# Patient Record
Sex: FEMALE | Race: WHITE | NOT HISPANIC OR LATINO | RURAL
[De-identification: names, ages, dates, MRNs, and addresses within clinical notes are randomized per-mention and may not be internally consistent; named-entity substitution may affect disease eponyms.]

---

## 2024-09-09 ENCOUNTER — HOSPITAL ENCOUNTER (EMERGENCY)
Facility: HOSPITAL | Age: 89
Discharge: HOME | End: 2024-09-09
Attending: EMERGENCY MEDICINE

## 2024-09-09 VITALS
BODY MASS INDEX: 29.99 KG/M2 | HEIGHT: 65 IN | HEART RATE: 78 BPM | WEIGHT: 180 LBS | RESPIRATION RATE: 20 BRPM | SYSTOLIC BLOOD PRESSURE: 188 MMHG | DIASTOLIC BLOOD PRESSURE: 98 MMHG | TEMPERATURE: 98 F | OXYGEN SATURATION: 97 %

## 2024-09-09 DIAGNOSIS — M79.18 MUSCULOSKELETAL PAIN: ICD-10-CM

## 2024-09-09 DIAGNOSIS — I10 HYPERTENSION, UNSPECIFIED TYPE: ICD-10-CM

## 2024-09-09 DIAGNOSIS — R10.9 LEFT FLANK PAIN: Primary | ICD-10-CM

## 2024-09-09 LAB
ALBUMIN SERPL BCP-MCNC: 3.7 G/DL (ref 3.5–5)
ALBUMIN/GLOB SERPL: 0.9 {RATIO}
ALP SERPL-CCNC: 79 U/L (ref 55–142)
ALT SERPL W P-5'-P-CCNC: 19 U/L (ref 13–56)
ANION GAP SERPL CALCULATED.3IONS-SCNC: 13 MMOL/L (ref 7–16)
AST SERPL W P-5'-P-CCNC: 15 U/L (ref 15–37)
BASOPHILS # BLD AUTO: 0.02 K/UL (ref 0–0.2)
BASOPHILS NFR BLD AUTO: 0.2 % (ref 0–1)
BILIRUB SERPL-MCNC: 0.8 MG/DL (ref ?–1.2)
BILIRUB UR QL STRIP: NEGATIVE
BUN SERPL-MCNC: 26 MG/DL (ref 7–18)
BUN/CREAT SERPL: 25 (ref 6–20)
CALCIUM SERPL-MCNC: 9.3 MG/DL (ref 8.5–10.1)
CHLORIDE SERPL-SCNC: 94 MMOL/L (ref 98–107)
CLARITY UR: CLEAR
CO2 SERPL-SCNC: 28 MMOL/L (ref 21–32)
COLOR UR: YELLOW
CREAT SERPL-MCNC: 1.05 MG/DL (ref 0.55–1.02)
DIFFERENTIAL METHOD BLD: ABNORMAL
EGFR (NO RACE VARIABLE) (RUSH/TITUS): 50 ML/MIN/1.73M2
EOSINOPHIL # BLD AUTO: 0.04 K/UL (ref 0–0.5)
EOSINOPHIL NFR BLD AUTO: 0.5 % (ref 1–4)
ERYTHROCYTE [DISTWIDTH] IN BLOOD BY AUTOMATED COUNT: 13.1 % (ref 11.5–14.5)
GLOBULIN SER-MCNC: 3.9 G/DL (ref 2–4)
GLUCOSE SERPL-MCNC: 112 MG/DL (ref 74–106)
GLUCOSE UR STRIP-MCNC: NEGATIVE MG/DL
HCT VFR BLD AUTO: 41.2 % (ref 38–47)
HGB BLD-MCNC: 13.3 G/DL (ref 12–16)
KETONES UR STRIP-SCNC: NEGATIVE MG/DL
LEUKOCYTE ESTERASE UR QL STRIP: NEGATIVE
LYMPHOCYTES # BLD AUTO: 1.92 K/UL (ref 1–4.8)
LYMPHOCYTES NFR BLD AUTO: 22 % (ref 27–41)
MCH RBC QN AUTO: 30.4 PG (ref 27–31)
MCHC RBC AUTO-ENTMCNC: 32.3 G/DL (ref 32–36)
MCV RBC AUTO: 94.3 FL (ref 80–96)
MONOCYTES # BLD AUTO: 0.88 K/UL (ref 0–0.8)
MONOCYTES NFR BLD AUTO: 10.1 % (ref 2–6)
MPC BLD CALC-MCNC: 8.7 FL (ref 9.4–12.4)
NEUTROPHILS # BLD AUTO: 5.85 K/UL (ref 1.8–7.7)
NEUTROPHILS NFR BLD AUTO: 67.2 % (ref 53–65)
NITRITE UR QL STRIP: NEGATIVE
PH UR STRIP: 6.5 PH UNITS
PLATELET # BLD AUTO: 245 K/UL (ref 150–400)
POTASSIUM SERPL-SCNC: 5.1 MMOL/L (ref 3.5–5.1)
PROT SERPL-MCNC: 7.6 G/DL (ref 6.4–8.2)
PROT UR QL STRIP: NEGATIVE
RBC # BLD AUTO: 4.37 M/UL (ref 4.2–5.4)
RBC # UR STRIP: NEGATIVE /UL
SODIUM SERPL-SCNC: 130 MMOL/L (ref 136–145)
SP GR UR STRIP: 1.01
UROBILINOGEN UR STRIP-ACNC: 0.2 MG/DL
WBC # BLD AUTO: 8.71 K/UL (ref 4.5–11)

## 2024-09-09 PROCEDURE — 99284 EMERGENCY DEPT VISIT MOD MDM: CPT | Mod: 25

## 2024-09-09 PROCEDURE — 80053 COMPREHEN METABOLIC PANEL: CPT | Performed by: EMERGENCY MEDICINE

## 2024-09-09 PROCEDURE — 81003 URINALYSIS AUTO W/O SCOPE: CPT | Performed by: EMERGENCY MEDICINE

## 2024-09-09 PROCEDURE — 36415 COLL VENOUS BLD VENIPUNCTURE: CPT | Performed by: EMERGENCY MEDICINE

## 2024-09-09 PROCEDURE — 85025 COMPLETE CBC W/AUTO DIFF WBC: CPT | Performed by: EMERGENCY MEDICINE

## 2024-09-09 RX ORDER — CARVEDILOL 12.5 MG/1
12.5 TABLET ORAL 2 TIMES DAILY WITH MEALS
COMMUNITY

## 2024-09-09 RX ORDER — OMEPRAZOLE 20 MG/1
20 TABLET, DELAYED RELEASE ORAL DAILY
COMMUNITY

## 2024-09-09 RX ORDER — FUROSEMIDE 40 MG/1
40 TABLET ORAL DAILY PRN
COMMUNITY

## 2024-09-09 RX ORDER — DABIGATRAN ETEXILATE 150 MG/1
CAPSULE ORAL
COMMUNITY

## 2024-09-09 RX ORDER — CLOPIDOGREL BISULFATE 75 MG/1
75 TABLET ORAL DAILY
COMMUNITY

## 2024-09-09 RX ORDER — LOSARTAN POTASSIUM 50 MG/1
50 TABLET ORAL 2 TIMES DAILY
COMMUNITY

## 2024-09-09 RX ORDER — HYDROCODONE BITARTRATE AND ACETAMINOPHEN 5; 325 MG/1; MG/1
1 TABLET ORAL EVERY 12 HOURS PRN
COMMUNITY

## 2024-09-09 NOTE — ED PROVIDER NOTES
Encounter Date: 9/9/2024       History     Chief Complaint   Patient presents with    Flank Pain     Left      PT IS A 89 YR OLD WF WITH L FLANK PAIN ONSET THIS AM DESCRIBED AS SHARP AND JUST HURTS, PT CONCERNED AS SHE HAS HAD R NEPHRECTOMY PRIOR  (DONOR FOR HER SON). PT HAS HAD A PRIOR KIDNEY STONE   PT DENIES FEVER/ABI, N/V/D, PALPITATIONS,TRAUMA OR SYNCOPE    The history is provided by the patient and a relative.     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  Social History     Tobacco Use    Smoking status: Never    Smokeless tobacco: Never   Substance Use Topics    Alcohol use: Never    Drug use: Never     Review of Systems    Physical Exam     Initial Vitals [09/09/24 1054]   BP Pulse Resp Temp SpO2   (!) 205/107 78 20 97.6 °F (36.4 °C) 97 %      MAP       --         Physical Exam    Nursing note and vitals reviewed.  Constitutional: She appears well-developed and well-nourished. She is cooperative. She appears distressed.   HENT:   Head: Normocephalic and atraumatic.   Right Ear: External ear normal.   Left Ear: External ear normal.   Nose: Nose normal.   Mouth/Throat: Oropharynx is clear and moist.   Eyes: Conjunctivae and EOM are normal. Pupils are equal, round, and reactive to light.   Neck: Trachea normal. Neck supple.   Cardiovascular:  Normal rate, regular rhythm, normal heart sounds and intact distal pulses.           Pulses:       Radial pulses are 3+ on the right side and 3+ on the left side.        Dorsalis pedis pulses are 3+ on the right side and 3+ on the left side.   Pulmonary/Chest: Breath sounds normal. No  respiratory distress.   Abdominal: Abdomen is soft. Bowel sounds are normal. She exhibits no distension. There is no abdominal tenderness.   Musculoskeletal:         General: Tenderness (L FLANK) present. Normal range of motion.      Right shoulder: Normal.      Left shoulder: Normal.      Right upper arm: Normal.      Left upper arm: Normal.      Right elbow: Normal.      Left elbow: Normal.      Right forearm: Normal.      Left forearm: Normal.      Right wrist: Normal.      Left wrist: Normal.      Right hand: Normal.      Left hand: Normal.      Cervical back: Neck supple.     Lymphadenopathy:     She has no cervical adenopathy.     She has no axillary adenopathy.   Neurological: She is alert and oriented to person, place, and time. She has normal strength. No cranial nerve deficit or sensory deficit. She displays a negative Romberg sign. GCS eye subscore is 4. GCS verbal subscore is 5. GCS motor subscore is 6.   Reflex Scores:       Bicep reflexes are 2+ on the right side and 2+ on the left side.       Patellar reflexes are 2+ on the right side and 2+ on the left side.  Skin: Skin is warm and dry. Capillary refill takes less than 2 seconds. No rash noted.   Psychiatric: She has a normal mood and affect. Her speech is normal and behavior is normal. Judgment and thought content normal. Cognition and memory are normal.         Medical Screening Exam   See Full Note    ED Course   Procedures  Labs Reviewed   COMPREHENSIVE METABOLIC PANEL - Abnormal       Result Value    Sodium 130 (*)     Potassium 5.1      Chloride 94 (*)     CO2 28      Anion Gap 13      Glucose 112 (*)     BUN 26 (*)     Creatinine 1.05 (*)     BUN/Creatinine Ratio 25 (*)     Calcium 9.3      Total Protein 7.6      Albumin 3.7      Globulin 3.9      A/G Ratio 0.9      Bilirubin, Total 0.8      Alk Phos 79      ALT 19      AST 15      eGFR 50 (*)    CBC WITH DIFFERENTIAL - Abnormal    WBC 8.71      RBC 4.37      Hemoglobin 13.3      Hematocrit  41.2      MCV 94.3      MCH 30.4      MCHC 32.3      RDW 13.1      Platelet Count 245      MPV 8.7 (*)     Neutrophils % 67.2 (*)     Lymphocytes % 22.0 (*)     Neutrophils, Abs 5.85      Lymphocytes, Absolute 1.92      Diff Type Auto      Monocytes % 10.1 (*)     Eosinophils % 0.5 (*)     Basophils % 0.2      Monocytes, Absolute 0.88 (*)     Eosinophils, Absolute 0.04      Basophils, Absolute 0.02     CBC W/ AUTO DIFFERENTIAL    Narrative:     The following orders were created for panel order CBC Auto Differential.  Procedure                               Abnormality         Status                     ---------                               -----------         ------                     CBC with Differential[[id number]]       Abnormal            Final result                 Please view results for these tests on the individual orders.   URINALYSIS    Color, UA Yellow      Clarity, UA Clear      pH, UA 6.5      Leukocytes, UA Negative      Nitrites, UA Negative      Protein, UA Negative      Glucose, UA Negative      Ketones, UA Negative      Urobilinogen, UA 0.2      Bilirubin, UA Negative      Blood, UA Negative      Specific Gravity, UA 1.010            Imaging Results               CT Abdomen Pelvis  Without Contrast (Final result)  Result time 09/09/24 11:35:00      Final result by Cody Fernandez MD (09/09/24 11:35:00)                   Impression:      This report was flagged in Epic as abnormal.    1. Allowing for motion artifact, no findings to suggest obstructive uropathy.  Please note, the right kidney is absent, correlation with patient history advised, comparison with previous exams would be helpful to assess any changes of the nephrectomy bed.  2. Colonic diverticulosis without convincing inflammation to suggest diverticulitis.  3. Small pericardial effusion.  4. Questionable ground-glass nodule within the right middle lobe versus volume averaging.  Correlation and follow-up as per clinical  suspicion.  Comparison with previous exams would be helpful.  5. Please see above for several additional findings.      Electronically signed by: Cody Fernandez MD  Date:    09/09/2024  Time:    11:35               Narrative:    EXAMINATION:  CT ABDOMEN PELVIS WITHOUT CONTRAST    CLINICAL HISTORY:  Flank pain, kidney stone suspected;    TECHNIQUE:  Low dose axial images, sagittal and coronal reformations were obtained from the lung bases to the pubic symphysis.  Oral contrast was not administered.    COMPARISON:  None    FINDINGS:  Images of the lower thorax are remarkable for bilateral dependent atelectasis.  There is a questionable ground-glass nodule versus volume averaging within the right middle lobe measuring 5 mm.  The heart is prominent.  There is a small pericardial effusion.    The liver, spleen, pancreas and adrenal glands have a grossly unremarkable noncontrast appearance.  The gallbladder is surgically absent.  There is no biliary dilation or ascites.  No significant abdominal lymphadenopathy.    There is no left hydronephrosis or left nephrolithiasis noting left extrarenal pelvis.  There is a subcentimeter low attenuating lesion arising from the interpolar region of the left kidney, too small for characterization.  The left ureter is unremarkable without calculi seen.  The right kidney is absent, no discrete abnormalities within the nephrectomy bed however comparison with previous examinations is recommended.  The urinary bladder is decompressed without wall thickening.  The uterus is absent the adnexa is unremarkable.    There are a few scattered colonic diverticula without inflammation.  The terminal ileum is unremarkable.  The appendix is not confidently identified, no pericecal inflammation.  A diverticulum arises from the 2nd portion/3rd portion of the duodenum, without inflammation.  There are a few scattered shotty periaortic, pericaval, and mesenteric lymph nodes.  There is atherosclerotic  calcification of the aorta and its branches.  No focal organized pelvic fluid collection.    There is osteopenia.  There are degenerative changes of the spine.  There is vertebroplasty change.  No significant inguinal lymphadenopathy.  There are gluteal calcifications.                                    X-Rays:   Independently Interpreted Readings:   Abdomen: Viewed and Other Results - Imaging    Updated   Order   09/09/24 1137  CT Abdomen Pelvis  Without Contrast  Performed: 09/09/24 1118  Final         Impression: This report was flagged in Epic as abnormal. 1. Allowing for motion artifact, no findings to suggest obstructive uropathy. Please note, the right kidney is absent, correlation with patient history advi...           Medications - No data to display  Medical Decision Making  PT IS A 89 YR OLD WF WITH L FLANK PAIN ONSET THIS AM DESCRIBED AS SHARP AND JUST HURTS, PT CONCERNED AS SHE HAS HAD R NEPHRECTOMY PRIOR  (DONOR FOR HER SON). PT HAS HAD A PRIOR KIDNEY STONE   PT DENIES FEVER/ABI, N/V/D, PALPITATIONS,TRAUMA OR SYNCOPE    Amount and/or Complexity of Data Reviewed  Labs: ordered.     Details: Viewed and Other Results - Labs      Updated   Order   09/09/24 1142  Comprehensive metabolic panel  Collected: 09/09/24 1116  Final result  Specimen: Blood      Sodium 130 Low  mmol/L  Potassium 5.1 mmol/L  Chloride 94 Low  mmol/L  CO2 28 mmol/L  Anion Gap 13 mmol/L  Glucose 112 High  mg/dL  BUN 26 High  mg/dL  Creatinine 1.05 High  mg/dL  BUN/Creatinine Ratio 25 High   Calcium 9.3 mg/dL  Total Protein 7.6 g/dL  Albumin 3.7 g/dL  Globulin 3.9 g/dL  A/G Ratio 0.9  Bilirubin, Total 0.8 mg/dL  Alk Phos 79 U/L  ALT 19 U/L  AST 15 U/L  eGFR 50 Low  mL/min/1.73m2       09/09/24 1121  CBC Auto Differential  Collected: 09/09/24 1116  Final result  Specimen: Blood         09/09/24 1121  CBC with Differential  Collected: 09/09/24 1116  Final result  Specimen: Blood      WBC 8.71 K/uL  RBC 4.37 M/uL  Hemoglobin 13.3  "g/dL  Hematocrit 41.2 %  MCV 94.3 fL  MCH 30.4 pg  MCHC 32.3 g/dL  RDW 13.1 %  Platelet Count 245 K/uL  MPV 8.7 Low  fL  Neutrophils % 67.2 High  %  Lymphocytes % 22.0 Low  %  Neutrophils, Abs 5.85 K/uL  Lymphocytes, Absolute 1.92 K/uL  Diff Type Auto  Monocytes % 10.1 High  %  Eosinophils % 0.5 Low  %  Basophils % 0.2 %  Monocytes, Absolute 0.88 High  K/uL  Eosinophils, Absolute 0.04 K/uL  Basophils, Absolute 0.02 K/uL       09/09/24 1122  Urinalysis  Collected: 09/09/24 1108  Final result  Specimen: Urine, Clean Catch      Color, UA Yellow  Clarity, UA Clear  pH, UA 6.5 pH Units  Leukocytes, UA Negative  Nitrites, UA Negative  Protein, UA Negative  Glucose, UA Negative mg/dL  Ketones, UA Negative mg/dL  Urobilinogen, UA 0.2 mg/dL  Bilirubin, UA Negative  Blood, UA Negative  Specific Gravity, UA 1.010            Radiology: ordered.     Details: Viewed and Other Results - Imaging    Updated   Order   09/09/24 1137  CT Abdomen Pelvis  Without Contrast  Performed: 09/09/24 1118  Final         Impression: This report was flagged in Epic as abnormal. 1. Allowing for motion artifact, no findings to suggest obstructive uropathy. Please note, the right kidney is absent, correlation with patient history advi...        Discussion of management or test interpretation with external provider(s): EXAM  TENDER L FLANK  CT NEG  LABS , BUN CREAT 26/1.05, NA /94, UA CLEAR X UROBILOGEN WITH NORMAL LFT  PT REMAINS " SORE " IN THE L FLANK  DC HOME IN STABLE CONDITION, ADVISED TO OBSERVE FOR A SHINGLES TYPE RASH, AND PT HAS HAD PRIOR SHINGLES I BELIEVE ON R   PT STATES BP IS ALWAYS UP IN A MEDICAL SETTING, AND SHE WILL RECHECK AND MONITOR                                      Clinical Impression:   Final diagnoses:  [R10.9] Left flank pain (Primary)  [M79.18] Musculoskeletal pain  [I10] Hypertension, unspecified type        ED Disposition Condition    Discharge Stable          ED Prescriptions    None       Follow-up " Information       Follow up With Specialties Details Why Contact Info    Nani Lazcano MD Family Medicine   [address]  [facility] [place] [address]  [phone]               Ivy Mobley MD  09/19/24 8216

## 2024-09-09 NOTE — DISCHARGE INSTRUCTIONS
INCREASE REST AND FLUIDS   MEDICATION AS DIRECTED  OVER THE COUNTER TYLENOL AS NEEDED FOR PAIN  ICE 2 DAYS THEN HEAT

## 2024-09-11 ENCOUNTER — TELEPHONE (OUTPATIENT)
Dept: EMERGENCY MEDICINE | Facility: HOSPITAL | Age: 89
End: 2024-09-11